# Patient Record
Sex: MALE | Race: WHITE | Employment: OTHER | URBAN - METROPOLITAN AREA
[De-identification: names, ages, dates, MRNs, and addresses within clinical notes are randomized per-mention and may not be internally consistent; named-entity substitution may affect disease eponyms.]

---

## 2017-03-24 ENCOUNTER — ALLSCRIPTS OFFICE VISIT (OUTPATIENT)
Dept: OTHER | Facility: OTHER | Age: 82
End: 2017-03-24

## 2017-03-29 ENCOUNTER — GENERIC CONVERSION - ENCOUNTER (OUTPATIENT)
Dept: OTHER | Facility: OTHER | Age: 82
End: 2017-03-29

## 2017-09-14 ENCOUNTER — ALLSCRIPTS OFFICE VISIT (OUTPATIENT)
Dept: OTHER | Facility: OTHER | Age: 82
End: 2017-09-14

## 2017-10-04 ENCOUNTER — GENERIC CONVERSION - ENCOUNTER (OUTPATIENT)
Dept: OTHER | Facility: OTHER | Age: 82
End: 2017-10-04

## 2018-01-12 NOTE — PROGRESS NOTES
Assessment    1  Initial Medicare annual wellness visit (V70 0) (Z00 00)   2  Need for pneumococcal vaccination (V03 82) (Z23)    Plan  Health Maintenance    · Zoster (Zostavax) (Zoster (Zostavax))      Health Maintenance (V70 0) (Z00 00)          Discussion/Summary    Prevnar 13 today  Impression: Initial Annual Wellness Visit, with preventive exam as well as age and risk appropriate counseling completed  Cardiovascular screening and counseling: the risks and benefits of screening were discussed and screening is current  Diabetes screening and counseling: the risks and benefits of screening were discussed and screening is current  Colorectal cancer screening and counseling: the risks and benefits of screening were discussed, screening is current and he does not wish to get screened anymore  Osteoporosis screening and counseling: screening not indicated  Abdominal aortic aneurysm screening and counseling: screening is current  Glaucoma screening and counseling: the risks and benefits of screening were discussed and screening is current  HIV screening and counseling: the patient declines screening and screening not indicated  (had labs today which are pending) Immunizations: the risks and benefits of influenza vaccination were discussed with the patient, influenza vaccine is up to date this year, the risks and benefits of pneumococcal vaccination were discussed with the patient, pneumococcal vaccine due today, the risks and benefits of the Zostavax vaccine were discussed with the patient and Zostavax vaccine needed today  Advance Directive Planning: complete and up to date, paperwork and instructions were given to the patient  Patient Discussion: plan discussed with the patient  Chief Complaint  Patient is here for a Medicare Well Exam       History of Present Illness  The patient is being seen for the initial annual wellness visit     Medicare Screening and Risk Factors Hospitalizations: no previous hospitalizations  Medicare Screening Tests Risk Questions   Abdominal aortic aneurysm risk assessment: over 72years of age and yes has assessment  next appt 4/16 dr Drew Fry  Osteoporosis risk assessment:  and over 48years of age  HIV risk assessment: none indicated  Drug and Alcohol Use: The patient is a former cigarette smoker and quit smoking 20yrs ago  The patient reports never drinking alcohol  He has never used illicit drugs  Diet and Physical Activity: Current diet includes well balanced meals, limited junk food, 2 servings of fruit per day, 2 servings of vegetables per day, 1 servings of meat per day, 1 servings of whole grains per day, 1 servings of simple carbohydrates per day, 2 servings of dairy products per day, 3 cups of coffee per day and 1 cans of diet soda per day  He exercises 3 times per week  Exercise: walking, stretching 50-80 minutes per week  Mood Disorder and Cognitive Impairment Screening: He denies feeling down, depressed, or hopeless over the past two weeks  He denies feeling little interest or pleasure in doing things over the past two weeks  Functional Ability/Level of Safety: Hearing is slightly decreased, slightly decreased in the right ear, slightly decreased in the left ear and a hearing aid is not used  He reports hearing difficulties  The patient is currently able to do activities of daily living without limitations, able to participate in social activities without limitations and able to drive without limitations  Activities of daily living details: needs help managing medications, but does not need help using the phone, no transportation help needed, does not need help shopping, no meal preparation help needed, does not need help doing housework, does not need help doing laundry and does not need help managing money  Fall risk factors:  polypharmacy and antihypertensive use, but The patient fell 0 times in the past 12 months  Home safety risk factors:  no unfamiliar surroundings, no loose rugs, no poor household lighting, no uneven floors, no household clutter, grab bars in the bathroom and handrails on the stairs  Advance Directives: Advance directives: living will, durable power of  for health care directives and advance directives  Co-Managers and Medical Equipment/Suppliers: See Patient Care Team      Patient Care Team    Care Team Member Role Specialty Office Number   [id number]  Nephrology [phone]   Zeus Reid MD  Peripheral Vascular Disease [phone]     Review of Systems    Constitutional: negative  Eyes: negative  ENT: negative  Cardiovascular: negative  Respiratory: negative  Gastrointestinal: negative  Genitourinary: negative  Musculoskeletal: diffuse joint pain  Integumentary and Breasts: negative  Neurological: negative  Psychiatric: negative  Endocrine: negative  Hematologic and Lymphatic: negative  Over the past 2 weeks, how often have you been bothered by the following problems? 1 ) Little interest or pleasure in doing things? Not at all    2 ) Feeling down, depressed or hopeless? Not at all    3 ) Trouble falling asleep or sleeping too much? Not at all    4 ) Feeling tired or having little energy? Not at all    5 ) Poor appetite or overeating? Not at all    6 ) Feeling bad about yourself, or that you are a failure, or have let yourself or your family down? Not at all    7 ) Trouble concentrating on things, such as reading a newspaper or watching television? Not at all    8 ) Moving or speaking so slowly that other people could have noticed, or the opposite, moving or speaking faster than usual? Not at all  How difficult have these problems made it for you to do your work, take care of things at home, or get along with people? Not at all  Score 0         Preventive Quality 65 and Older: Falls Risk: The patient fell 0 times in the past 12 months     The patient currently has no urinary incontinence symptoms  Active Problems    1  CKD (chronic kidney disease) (585 9) (N18 9)   2  Colon cancer screening (V76 51) (Z12 11)   3  Gout (274 9) (M10 9)   4  Hypercholesterolemia (272 0) (E78 0)   5  Hypertension (401 9) (I10)   6  Hypertensive kidney disease, benign (403 10,585 9) (I12 9,N18 9)   7  Initial Medicare annual wellness visit (V70 0) (Z00 00)   8  Need for pneumococcal vaccination (V03 82) (Z23)      Hypertension (401 9) (I10)       Hypercholesterolemia (272 0) (E78 0)       CKD (chronic kidney disease) (585 9) (N18 9)          Past Medical History    1  History of Chronic kidney disease, stage 3 (585 3) (N18 3)   2  CKD (chronic kidney disease) (585 9) (N18 9)   3  History of Gout (274 9) (M10 9)   4  History of abdominal aortic aneurysm (V12 59) (Z86 79)    The active problems and past medical history were reviewed and updated today  CKD (chronic kidney disease) (585 9) (N18 9)             Surgical History    1  History of Umbilical Hernia Herniorrhaphy    The surgical history was reviewed and updated today  Family History    1  No pertinent family history    The family history was reviewed and updated today  Social History    · Caffeine Use   · Former smoker (P24 58) (G56 123)   · Never Drank Alcohol  The social history was reviewed and updated today  Current Meds   1  Adult Aspirin EC Low Strength 81 MG Oral Tablet Delayed Release Recorded   2  AmLODIPine Besylate 5 MG Oral Tablet; Therapy: 47TOK6178 to (Last Rx:11Oct2010)  Requested for: 16KVZ6728 Ordered   3  Atorvastatin Calcium 10 MG Oral Tablet; take 1 tablet by mouth once daily; Therapy: 03HUP2122 to (Neda Padilla)  Requested for: 73GBD6926; Last   Rx:09Mar2016 Ordered   4  Metoprolol Succinate  MG Oral Tablet Extended Release 24 Hour; TAKE 1 TABLET   DAILY; Therapy: 95QWE2014 to (Evaluate:04Mar2017); Last Rx:09Mar2016 Ordered   5   Multiple Vitamins Oral Tablet Recorded   6  Potassium Chloride ER 10 MEQ Oral Tablet Extended Release; TAKE ONE (1)   TABLET(S) DAILY; Therapy: 90ELU5317 to (Evaluate:04Mar2017); Last Rx:09Mar2016 Ordered   7  Uloric 40 MG Oral Tablet; TAKE 1 TABLET BY MOUTH ONCE DAILY; Therapy: 19VOJ6608 to (Evaluate:07Jul2016)  Requested for: 61NIZ2871; Last   Rx:09Mar2016 Ordered   8  Valsartan-Hydrochlorothiazide 320-12 5 MG Oral Tablet; Take 1 tablet daily; Therapy: 84SAF8361 to (Evaluate:04Mar2017); Last Rx:09Mar2016 Ordered   9  Vitamin D 1000 UNIT Oral Tablet Recorded   10  Zoster (Zostavax); as directed; Therapy: 54PHG4274 to (Last Rx:50Yxg0610) Ordered    The medication list was reviewed and updated today  Allergies    1  Aciphex TBEC   2  Augmentin TABS   3  Prevacid 24HR CPDR    Immunizations  Influenza --- Amarilys Pippins: 27UMA2234Pughe Sago: 35FDX2954Pfoiwcrt Rodriguez: 19YLI7276; La Kappa: 62HWA3355   Pneumococcal --- Amarilys Pippins: 11HFU3212     Vitals  Signs [Data Includes: Current Encounter]   Recorded: 49SMY9968 11:04AM   Temperature: 98 2 F  Heart Rate: 78  Systolic: 236  Diastolic: 82  Height: 6 ft   Weight: 204 lb 3 2 oz  BMI Calculated: 27 69  BSA Calculated: 2 15  O2 Saturation: 94    Results/Data  Encounter Results   PHQ-9 Adult Depression Screening 31JPT2996 11:13AM User, Ahs     Test Name Result Flag Reference   PHQ-9 Adult Depression Score 0     Q1: 0, Q2: 0, Q3: 0, Q4: 0, Q5: 0, Q6: 0, Q7: 0, Q8: 0, Q9: 0   PHQ-9 Adult Depression Screening Negative     PHQ-9 Difficulty Level Not difficult at all     PHQ-9 Severity No Depression         Future Appointments    Date/Time Provider Specialty Site   09/09/2016 10:20 AM Anjel Lua MD Family Medicine [address] [place] [address]     Physical Exam    Constitutional   General appearance: No acute distress, well appearing and well nourished  Eyes   Conjunctiva and lids: No erythema, swelling or discharge  Pupils and irises: Equal, round, reactive to light      Ears, Nose, Mouth, and Throat   External inspection of ears and nose: Normal     Otoscopic examination: Tympanic membranes translucent with normal light reflex  Canals patent without erythema  Hearing: Normal     Nasal mucosa, septum, and turbinates: Normal without edema or erythema  Lips, teeth, and gums: Normal, good dentition  Oropharynx: Normal with no erythema, edema, exudate or lesions  Neck   Neck: Supple, symmetric, trachea midline, no masses  Pulmonary   Respiratory effort: No increased work of breathing or signs of respiratory distress  Auscultation of lungs: Clear to auscultation  Cardiovascular   Auscultation of heart: Normal rate and rhythm, normal S1 and S2, no murmurs  Carotid pulses: 2+ bilaterally  Examination of extremities for edema and/or varicosities: Normal     Abdomen   Abdomen: Non-tender, no masses  Lymphatic   Palpation of lymph nodes in neck: No lymphadenopathy  Musculoskeletal   Gait and station: Normal     Inspection/palpation of digits and nails: Normal without clubbing or cyanosis  Inspection/palpation of joints, bones, and muscles: Normal     Range of motion: Normal     Stability: Normal     Muscle strength/tone: Normal     Psychiatric   Judgment and insight: Normal     Orientation to person, place and time: Normal     Recent and remote memory: Intact      Mood and affect: Normal        Signatures   Electronically signed by : Leyla Guzman MD; Mar  9 2016 11:49AM EST                       (Author)

## 2018-01-13 VITALS
TEMPERATURE: 97.8 F | BODY MASS INDEX: 28.32 KG/M2 | WEIGHT: 208.81 LBS | RESPIRATION RATE: 16 BRPM | OXYGEN SATURATION: 96 % | SYSTOLIC BLOOD PRESSURE: 140 MMHG | DIASTOLIC BLOOD PRESSURE: 82 MMHG | HEART RATE: 72 BPM

## 2018-01-14 VITALS
DIASTOLIC BLOOD PRESSURE: 82 MMHG | BODY MASS INDEX: 28.42 KG/M2 | HEART RATE: 62 BPM | OXYGEN SATURATION: 96 % | WEIGHT: 209.8 LBS | HEIGHT: 72 IN | TEMPERATURE: 97.1 F | SYSTOLIC BLOOD PRESSURE: 138 MMHG

## 2018-01-17 NOTE — PROGRESS NOTES
Assessment    1  Medicare annual wellness visit, subsequent (V70 0) (Z00 00)    Plan   Gout    · Metoprolol Succinate  MG Oral Tablet Extended Release 24 Hour; Take 1  tablet by mouth  daily   · Valsartan-Hydrochlorothiazide 320-12 5 MG Oral Tablet; Take 1 tablet by mouth   daily  Hypercholesterolemia    · Atorvastatin Calcium 10 MG Oral Tablet (Lipitor); take 1 tablet by mouth once  daily  Hypertension    · Potassium Chloride ER 10 MEQ Oral Tablet Extended Release; Take 1 tablet by  mouth  daily  Medicare annual wellness visit, subsequent    · Eat a low fat and low cholesterol diet ; Status:Complete;   Done: 23HHU3302   · Keep a diary of when and what you eat ; Status:Complete;   Done: 52PUL8437   · There are many exercise options for seniors ; Status:Complete;   Done: 12QAW2937   · These are things you can do to prevent falls in and around the home ; Status:Complete;    Done: 28WAI5548    Hypertension (401 9) (I10)       Hypercholesterolemia (272 0) (E78 00)          Discussion/Summary    AWV completed  Advised him to bring a copy of advanced directives  Declined colon cancer screening  He will get Shingles vaccine at pharmacy  Impression: Subsequent Annual Wellness Visit, with preventive exam as well as age and risk appropriate counseling completed  Cardiovascular screening and counseling: the risks and benefits of screening were discussed and screening is current  Diabetes screening and counseling: the risks and benefits of screening were discussed and screening is current  Colorectal cancer screening and counseling: the risks and benefits of screening were discussed, the patient declines screening and screening not indicated  Prostate cancer screening and counseling: the risks and benefits of screening were discussed and screening not indicated  Osteoporosis screening and counseling: screening not indicated     Abdominal aortic aneurysm screening and counseling: the risks and benefits of screening were discussed and screening is current  Glaucoma screening and counseling: the risks and benefits of screening were discussed and screening is current  HIV screening and counseling: the patient declines screening  (He had labs done about 1 week ago - Dr Hunter Martin ordered  ) Immunizations: the risks and benefits of influenza vaccination were discussed with the patient, influenza vaccine is up to date this year, the risks and benefits of pneumococcal vaccination were discussed with the patient, the lifetime pneumococcal vaccine has been completed, the risks and benefits of the Zostavax vaccine were discussed with the patient, Zostavax vaccine needed today, the risks and benefits of the Tdap vaccine were discussed with the patient and the patient declines the Tdap vaccine  Advance Directive Planning: complete and up to date, paperwork and instructions were given to the patient, he was encouraged to follow-up with me to discuss his questions and/or decisions  Patient Discussion: plan discussed with the patient, follow-up visit needed in 6 months   Chief Complaint  AWV      Advance Directives  Advance Directive [facility]:   YES - Patient has an advance health care directive  History of Present Illness  The patient is being seen for the subsequent annual wellness visit  Medicare Screening and Risk Factors   Hospitalizations: no previous hospitalizations  Once per lifetime medicare screening tests: AAA screening [place] (11/2016)  Medicare Screening Tests Risk Questions   Abdominal aortic aneurysm risk assessment: tobacco use, over 72years of age and He has an aneurysm  He is having this checked in April  Follows up with Vascular  Osteoporosis risk assessment: none indicated  HIV risk assessment: none indicated  Drug and Alcohol Use: The patient is a former cigarette smoker and quit smoking 15 YEARS AGO  The patient reports never drinking alcohol  He has never used illicit drugs     Diet and Physical Activity: Current diet includes well balanced meals, 2 servings of fruit per day, 3 servings of vegetables per day, 1 servings of whole grains per day, 2 servings of dairy products per day and 2 cups of coffee per day  Exercise: walking, strength training 15 minutes per day  Mood Disorder and Cognitive Impairment Screening: PHQ-9 Depression Scale He denies feeling down, depressed, or hopeless over the past two weeks  He denies feeling little interest or pleasure in doing things over the past two weeks  Functional Ability/Level of Safety: Hearing is normal bilaterally and a hearing aid is not used  The patient is currently able to do activities of daily living without limitations, able to do instrumental activities of daily living without limitations, able to participate in social activities without limitations and able to drive without limitations  Activities of daily living details: does not need help using the phone, no transportation help needed, does not need help shopping, no meal preparation help needed, does not need help doing housework, does not need help doing laundry, does not need help managing medications and does not need help managing money  Fall risk factors: The patient fell 0 times in the past 12 months  Advance Directives: Advance directives: living will, durable power of  for health care directives and advance directives  end of life decisions were reviewed with the patient  Co-Managers and Medical Equipment/Suppliers: See Patient Care Team      Patient Care Team    Care Team Member Role Specialty Office Number   [id number]  Nephrology [phone]   Ravi Lai MD  Peripheral Vascular Disease [phone]     Review of Systems    Constitutional: negative  Eyes: negative  ENT: negative  Cardiovascular: negative  Respiratory: negative  Gastrointestinal: negative  Genitourinary: negative  Musculoskeletal: negative     Integumentary and Breasts: negative  Neurological: negative  Psychiatric: negative  Endocrine: negative  Hematologic and Lymphatic: negative  Over the past 2 weeks, how often have you been bothered by the following problems? 1 ) Little interest or pleasure in doing things? Not at all    2 ) Feeling down, depressed or hopeless? Not at all    3 ) Trouble falling asleep or sleeping too much? Not at all    4 ) Feeling tired or having little energy? Not at all    5 ) Poor appetite or overeating? Not at all    6 ) Feeling bad about yourself, or that you are a failure, or have let yourself or your family down? Not at all    7 ) Trouble concentrating on things, such as reading a newspaper or watching television? Not at all    8 ) Moving or speaking so slowly that other people could have noticed, or the opposite, moving or speaking faster than usual? Not at all    9 ) Thoughts that you would be better off dead or of hurting yourself in some way? Not at all  Score 0      Active Problems     1  Colon cancer screening (V76 51) (Z12 11)   2  Gout (274 9) (M10 9)   3  Hypertensive kidney disease, benign (403 10) (I12 9)   4  Need for pneumococcal vaccination (V03 82) (Z23)    Hypertension (401 9) (I10)       Hypercholesterolemia (272 0) (E78 00)       CKD (chronic kidney disease) (585 9) (N18 9)          Past Medical History     1  History of Chronic kidney disease, stage 3 (585 3) (N18 3)   2  History of Gout (274 9) (M10 9)   3  History of abdominal aortic aneurysm (V12 59) (Z86 79)   4  Need for prophylactic vaccination and inoculation against influenza (V04 81) (Z23)    The active problems and past medical history were reviewed and updated today  CKD (chronic kidney disease) (585 9) (N18 9)             Surgical History    1  History of Umbilical Hernia Herniorrhaphy    The surgical history was reviewed and updated today  Family History  Sibling    1   No pertinent family history    The family history was reviewed and updated today  Social History    · Caffeine Use   · Former smoker (W67 01) (A31 120)   · Never Drank Alcohol  The social history was reviewed and updated today  Current Meds   1  Adult Aspirin EC Low Strength 81 MG Oral Tablet Delayed Release Recorded   2  AmLODIPine Besylate 5 MG Oral Tablet; Therapy: 27QPO3260 to (Last Rx:11Oct2010)  Requested for: 99GEZ2668 Ordered   3  Atorvastatin Calcium 10 MG Oral Tablet; take 1 tablet by mouth once daily; Therapy: 50IDZ4244 to (Evaluate:23Apr2017)  Requested for: 60CRC0879; Last   Rx:23Jan2017 Ordered   4  Metoprolol Succinate  MG Oral Tablet Extended Release 24 Hour; Take 1 tablet by   mouth  daily; Therapy: 46NBE4129 to (Evaluate:23Apr2017)  Requested for: 93YSN0339; Last   Rx:23Jan2017 Ordered   5  Multiple Vitamins Oral Tablet Recorded   6  Potassium Chloride ER 10 MEQ Oral Tablet Extended Release; Take 1 tablet by mouth    daily; Therapy: 98LAN5582 to (Evaluate:23Apr2017)  Requested for: 07HKM0826; Last   Rx:23Jan2017 Ordered   7  Uloric 40 MG Oral Tablet; take 1 tablet by mouth once daily; Therapy: 81CAW6204 to (Evaluate:11Jan2017)  Requested for: 21Kvv7163; Last   Rx:81Ewg7175 Ordered   8  Valsartan-Hydrochlorothiazide 320-12 5 MG Oral Tablet; Take 1 tablet by mouth  daily; Therapy: 70MML7294 to (Evaluate:23Apr2017)  Requested for: 39EHW9821; Last   Rx:23Jan2017 Ordered   9  Vitamin D 1000 UNIT Oral Tablet Recorded    The medication list was reviewed and updated today  Allergies    1  Aciphex TBEC   2  Augmentin TABS   3   Prevacid 24HR CPDR    Immunizations  Influenza --- Chavarria Ege: 28-Tjc-7001Wvfej Maroon: 96-Gzc-1982Wkohkb Roes: 18-Aug-2014; Mela Hunter:  09-Sep-2015; Series5: 09-Sep-2016   PCV --- Series1: 09-Mar-2016   PPSV --- Series1: 18-Nov-2004     Results/Data  PHQ-9 Adult Depression Screening 78BYK3555 10:54AM User, Ahs     Test Name Result Flag Reference   PHQ-9 Adult Depression Score 0     Over the last two weeks, how often have you been bothered by any of the following problems? Little interest or pleasure in doing things: Not at all - 0  Feeling down, depressed, or hopeless: Not at all - 0  Trouble falling or staying asleep, or sleeping too much: Not at all - 0  Feeling tired or having little energy: Not at all - 0  Poor appetite or over eating: Not at all - 0  Feeling bad about yourself - or that you are a failure or have let yourself or your family down: Not at all - 0  Trouble concentrating on things, such as reading the newspaper or watching television: Not at all - 0  Moving or speaking so slowly that other people could have noticed  Or the opposite -  being so fidgety or restless that you have been moving around a lot more than usual: Not at all - 0  Thoughts that you would be better off dead, or of hurting yourself in some way: Not at all - 0   PHQ-9 Adult Depression Screening Negative     PHQ-9 Difficulty Level Not difficult at all     PHQ-9 Severity No Depression         Health Management  Health Maintenance   Medicare Annual Wellness Visit; every 1 year; Next Due: [id number];  Overdue    Signatures   Electronically signed by : Judge Davy MD; Mar 24 2017 12:00PM EST                       (Author)

## 2018-03-27 ENCOUNTER — OFFICE VISIT (OUTPATIENT)
Dept: FAMILY MEDICINE CLINIC | Facility: CLINIC | Age: 83
End: 2018-03-27
Payer: MEDICARE

## 2018-03-27 ENCOUNTER — TRANSCRIBE ORDERS (OUTPATIENT)
Dept: ADMINISTRATIVE | Facility: HOSPITAL | Age: 83
End: 2018-03-27

## 2018-03-27 ENCOUNTER — APPOINTMENT (OUTPATIENT)
Dept: LAB | Facility: CLINIC | Age: 83
End: 2018-03-27
Payer: MEDICARE

## 2018-03-27 VITALS
HEIGHT: 67 IN | TEMPERATURE: 97.1 F | SYSTOLIC BLOOD PRESSURE: 130 MMHG | WEIGHT: 209.2 LBS | HEART RATE: 78 BPM | BODY MASS INDEX: 32.83 KG/M2 | DIASTOLIC BLOOD PRESSURE: 66 MMHG | OXYGEN SATURATION: 99 %

## 2018-03-27 DIAGNOSIS — I10 ESSENTIAL HYPERTENSION: ICD-10-CM

## 2018-03-27 DIAGNOSIS — E78.5 HYPERLIPIDEMIA, UNSPECIFIED HYPERLIPIDEMIA TYPE: Primary | ICD-10-CM

## 2018-03-27 DIAGNOSIS — E78.00 HYPERCHOLESTEROLEMIA: Primary | ICD-10-CM

## 2018-03-27 DIAGNOSIS — E78.5 HYPERLIPIDEMIA, UNSPECIFIED HYPERLIPIDEMIA TYPE: ICD-10-CM

## 2018-03-27 DIAGNOSIS — N18.9 CHRONIC KIDNEY DISEASE, UNSPECIFIED CKD STAGE: ICD-10-CM

## 2018-03-27 DIAGNOSIS — Z00.00 MEDICARE ANNUAL WELLNESS VISIT, SUBSEQUENT: Primary | ICD-10-CM

## 2018-03-27 DIAGNOSIS — M10.9 GOUT, UNSPECIFIED CAUSE, UNSPECIFIED CHRONICITY, UNSPECIFIED SITE: ICD-10-CM

## 2018-03-27 LAB
CHOLEST SERPL-MCNC: 162 MG/DL (ref 50–200)
HDLC SERPL-MCNC: 74 MG/DL (ref 40–60)
LDLC SERPL CALC-MCNC: 66 MG/DL (ref 0–100)
TRIGL SERPL-MCNC: 108 MG/DL

## 2018-03-27 PROCEDURE — G0439 PPPS, SUBSEQ VISIT: HCPCS | Performed by: FAMILY MEDICINE

## 2018-03-27 PROCEDURE — 36415 COLL VENOUS BLD VENIPUNCTURE: CPT

## 2018-03-27 PROCEDURE — 99214 OFFICE O/P EST MOD 30 MIN: CPT | Performed by: FAMILY MEDICINE

## 2018-03-27 PROCEDURE — 80061 LIPID PANEL: CPT

## 2018-03-27 RX ORDER — ASPIRIN 81 MG/1
TABLET ORAL
COMMUNITY

## 2018-03-27 RX ORDER — POTASSIUM CHLORIDE 750 MG/1
TABLET, FILM COATED, EXTENDED RELEASE ORAL
COMMUNITY
Start: 2018-03-12 | End: 2018-03-27 | Stop reason: SDUPTHER

## 2018-03-27 RX ORDER — ATORVASTATIN CALCIUM 10 MG/1
10 TABLET, FILM COATED ORAL DAILY
Qty: 90 TABLET | Refills: 3 | Status: SHIPPED | OUTPATIENT
Start: 2018-03-27 | End: 2019-03-28 | Stop reason: SDUPTHER

## 2018-03-27 RX ORDER — POTASSIUM CHLORIDE 750 MG/1
10 TABLET, FILM COATED, EXTENDED RELEASE ORAL DAILY
Qty: 90 TABLET | Refills: 3 | Status: SHIPPED | OUTPATIENT
Start: 2018-03-27 | End: 2019-03-28 | Stop reason: SDUPTHER

## 2018-03-27 RX ORDER — AMLODIPINE BESYLATE 5 MG/1
TABLET ORAL
COMMUNITY
Start: 2018-03-12 | End: 2018-03-27 | Stop reason: SDUPTHER

## 2018-03-27 RX ORDER — FEBUXOSTAT 40 MG/1
TABLET ORAL
COMMUNITY
Start: 2018-03-12 | End: 2018-03-27 | Stop reason: SDUPTHER

## 2018-03-27 RX ORDER — ATORVASTATIN CALCIUM 10 MG/1
TABLET, FILM COATED ORAL
COMMUNITY
Start: 2018-03-12 | End: 2018-03-27 | Stop reason: SDUPTHER

## 2018-03-27 RX ORDER — VALSARTAN AND HYDROCHLOROTHIAZIDE 320; 12.5 MG/1; MG/1
1 TABLET, FILM COATED ORAL DAILY
Qty: 90 TABLET | Refills: 3 | Status: SHIPPED | OUTPATIENT
Start: 2018-03-27 | End: 2018-09-27 | Stop reason: ALTCHOICE

## 2018-03-27 RX ORDER — VALSARTAN AND HYDROCHLOROTHIAZIDE 320; 12.5 MG/1; MG/1
TABLET, FILM COATED ORAL
COMMUNITY
Start: 2018-03-12 | End: 2018-03-27 | Stop reason: SDUPTHER

## 2018-03-27 RX ORDER — AMLODIPINE BESYLATE 5 MG/1
5 TABLET ORAL DAILY
Qty: 90 TABLET | Refills: 3 | Status: SHIPPED | OUTPATIENT
Start: 2018-03-27 | End: 2019-03-28 | Stop reason: SDUPTHER

## 2018-03-27 RX ORDER — FEBUXOSTAT 40 MG/1
40 TABLET ORAL DAILY
Qty: 90 TABLET | Refills: 3 | Status: SHIPPED | OUTPATIENT
Start: 2018-03-27 | End: 2019-03-28 | Stop reason: SDUPTHER

## 2018-03-27 RX ORDER — METOPROLOL SUCCINATE 100 MG/1
TABLET, EXTENDED RELEASE ORAL
COMMUNITY
Start: 2018-03-12 | End: 2018-03-27 | Stop reason: SDUPTHER

## 2018-03-27 RX ORDER — METOPROLOL SUCCINATE 100 MG/1
100 TABLET, EXTENDED RELEASE ORAL DAILY
Qty: 90 TABLET | Refills: 3 | Status: SHIPPED | OUTPATIENT
Start: 2018-03-27 | End: 2019-03-28 | Stop reason: SDUPTHER

## 2018-03-27 NOTE — PROGRESS NOTES
HPI:  Wilfredo Fisher is a 80 y o  male here for his Subsequent Wellness Visit  Patient Active Problem List   Diagnosis    CKD (chronic kidney disease)    Coronary atherosclerosis    Gout    Hypercholesterolemia    Hypertension     Past Medical History:   Diagnosis Date    Chronic kidney disease     stage 3  last assessed 14 sept 2017    Dissecting aortic aneurysm (any part), abdominal ([address] )     Gout      Past Surgical History:   Procedure Laterality Date    CHOLECYSTECTOMY      UMBILICAL HERNIA REPAIR       Family History   Problem Relation Age of Onset    No Known Problems Family      sibling (not specified)     History   Smoking Status    Former Smoker   Smokeless Tobacco    Never Used     History   Alcohol Use No      History   Drug use: Unknown     There were no vitals taken for this visit  Current Outpatient Prescriptions   Medication Sig Dispense Refill    amLODIPine (NORVASC) 5 mg tablet Take 1 tablet (5 mg total) by mouth daily 90 tablet 3    aspirin (ADULT ASPIRIN EC LOW STRENGTH) 81 mg EC tablet Take by mouth      atorvastatin (LIPITOR) 10 mg tablet Take 1 tablet (10 mg total) by mouth daily 90 tablet 3    cholecalciferol (VITAMIN D3) 1,000 units tablet Take 1 tablet by mouth once a week      metoprolol succinate (TOPROL-XL) 100 mg 24 hr tablet Take 1 tablet (100 mg total) by mouth daily 90 tablet 3    potassium chloride (K-DUR) 10 mEq tablet Take 1 tablet (10 mEq total) by mouth daily 90 tablet 3    ULORIC 40 MG tablet Take 1 tablet (40 mg total) by mouth daily 90 tablet 3    valsartan-hydrochlorothiazide (DIOVAN-HCT) 320-12 5 MG per tablet Take 1 tablet by mouth daily 90 tablet 3     No current facility-administered medications for this visit        Allergies   Allergen Reactions    Augmentin Es-600  [Amoxicillin-Pot Clavulanate]     Lansoprazole     Rabeprazole      Immunization History   Administered Date(s) Administered    Influenza Split High Dose Preservative Free IM 09/20/2013, 08/18/2014, 09/09/2016, 09/14/2017    Influenza TIV (IM) 09/20/2013, 09/09/2015    Pneumococcal Conjugate 13-Valent 03/09/2016    Pneumococcal Polysaccharide PPV23 11/18/2004       Patient Care Team:  Kylie Fitzpatrick MD as PCP - Raisa Tong MD      Medicare Screening Tests and Risk Assessments:  AWV Clinical     ISAR:   Previous hospitalizations?:  No       Once in a Lifetime Medicare Screening:   EKG performed?:  No    AAA screening performed? (if performed, please add date to Health Maintenance):  No       Medicare Screening Tests and Risk Assessment:   AAA Risk Assessment     Tobacco use (males only):   Yes   Age over 72 (males only):  Yes    Osteoporosis Risk Assessment    :  Yes    Age over 48:  Yes    Tobacco use:  Yes    HIV Risk Assessment    None indicated:  Yes        Drug and Alcohol Use:   Tobacco use    Cigarettes:  former smoker    Tobacco use duration    Tobacco Cessation Readiness    Alcohol use    Alcohol use:  never    Alcohol Treatment Readiness   Illicit Drug Use    Drug use:  never        Diet & Exercise:   Diet   What is your diet?:  Regular, No Added Salt, Limited junk food   How many servings a day of the following:   Fruits and Vegetables:  1-2 Meat:  1-2   Whole Grains:  1 Simple Carbs:  1   Dairy:  1 Soda:  0   Coffee:  1 Tea:  1   Exercise    Do you currently exercise?:  yes    Frequency:  daily    Type of exercise:  swimming       Cognitive Impairment Screening:   Cognitive Impairment Screening    Do you have difficulty learning or retaining new information?:  No        Functional Ability/Level of Safety:   Hearing    Hearing difficulties:  No Bilateral:  normal   Left:  normal Right:  normal   Hearing Impairment Assessment    Hearing status:  No impairment   Current Activities    Status:  unlimited ADL's, unlimited driving   Help needed with the folllowing:    Using the phone:  No Transportation:  No   Shopping:  No Preparing Meals:  No   Doing Housework:  No Doing Laundry:  No   Managing Medications:  No Managing Money:  No   ADL    Fall Risk   Have you fallen in the last 12 months?:  No    Injury History       Home Safety:   Are there hazards in your environment?:  No   If you fell, would you need help to get back up from the ground?:  No    Do you feel unsteady when walking?:  No    Do you have handrails and grab-bars in the home?:  No    Are you and/or family members aware of the dangers of smoking in bed?:  No Are firearms stored securely?:  Yes   Do you have working smoke alarms and fire extinguisher?:  No    Have you left the stove on unsupervised?:  No    Home Safety Risk Factors   Unfamilar with surroundings:  No Uneven floors:  No   Stairs or handrail saftey risk:  No Loose rugs:  No   Household clutter:  No Poor household lighting:  No   No grab bars in bathroom:  No Further evaluation needed:  No       Advanced Directives:   Advanced Directives    Living Will:  Yes Durable POA for healthcare:   Yes   Advanced directive:  Yes    Patient's End of Life Decisions        Urinary Incontinence:   Do you have urinary incontinence?:  No        Glaucoma:            Provider Screening     Preventative Screening/Counseling:   Cardiovascular Screening/Counseling:   (Labs Q5 years, EKG optional one-time)   General:  Risks and Benefits Discussed, Screening Current Counseling:  Healthy Diet          Diabetes Screening/Counseling:   (2 tests/year if Pre-Diabetes or 1 test/year if no Diabetes)   General:  Screening Current, Risks and Benefits Discussed Counseling:  Healthy Diet, Healthy Weight, Improve Physical Activity          Colorectal Cancer Screening/Counseling:   (FOBT Q1 yr; Flex Sig Q4 yrs or Q10 yrs after Screening Colonoscopy; Screening Colonoscpy Q2 yrs High Risk or Q10 yrs Low Risk; Barium Enema Q2 yrs High Risk or Q4 yrs Low Risk)   General:  Screening Not Indicated, Risks and Benefits Discussed           Prostate Cancer Screening/Counseling: (Annual)    General:  Risks and Benefits Discussed, Screening Not Indicated          Breast Cancer Screening/Counseling:   (Baseline Age 28 - 43; Annual Age 36+)         Cervical Cancer Screening/Counseling:   (Annual for High Risk or Childbearing Age with Abnormal Pap in Last 3 yrs; Every 2 all others)         Osteoporosis Screening/Counseling:   (Every 2 Yrs if at risk or more if medically necessary)   General:  Screening Not Indicated, Risks and Benefits Discussed           AAA Screening/Counseling:   (Once per Lifetime with risk factors)     Age over 72 (males only):  Yes Tobacco use (males only):  Yes   General:  Screening Current           Glaucoma Screening/Counseling:   (Annual)   General:  Risks and Benefits Discussed Due for: Referrals:  referral to ophthalmology         HIV Screening/Counseling:   (Voluntary;  Once annually for high risk OR 3 times for Pregnancy at diagnosis of IUP; 3rd trimester; and at Labor   General:  Screening Not Indicated           Hepatitis C Screening:             Immunizations:   Influenza (annual):  Risks & Benefits Discussed, Influenza UTD This Year   Pneumococcal (Once in a Lifetime):  Risks & Benefits Discussed, Lifetime Vaccine Completed   Zostavax (Medicare D Coverage, Pt >70 yo):  Risks & Benefits Discussed, Patient Declines       Other Preventative Couseling (Non-Medicare Wellness Visit Required):   nutrition counseling performed, fall prevention education provided, car/seat belt/driving safety reviewed, alcohol use counseling provided, Increased physical activity counseling given       Referrals (Non-Medicare Wellness Visit Required):       Medical Equipment/Suppliers:           No exam data present  Reviewed Updated [facility] Prior Wellness Visits:   Last Medicare wellness visit information was reviewed, patient interviewed , no change since last AWVyes  Last Medicare wellness visit information was reviewed, patient interviewed and updates made to the record today yes    Assessment and Plan:  1  Medicare annual wellness visit, subsequent         Health Maintenance Due   Topic Date Due    SLP PLAN OF CARE  1933    Depression Screening PHQ-9  02/12/1945    DTaP,Tdap,and Td Vaccines (1 - Tdap) 02/12/1954    GLAUCOMA SCREENING 67+ YR  08/11/2015       Labs done today  Advised eye exam - will schedule

## 2018-03-27 NOTE — PROGRESS NOTES
Assessment/Plan:     Diagnoses and all orders for this visit:    Hypercholesterolemia  -     atorvastatin (LIPITOR) 10 mg tablet; Take 1 tablet (10 mg total) by mouth daily    Essential hypertension  -     amLODIPine (NORVASC) 5 mg tablet; Take 1 tablet (5 mg total) by mouth daily  -     metoprolol succinate (TOPROL-XL) 100 mg 24 hr tablet; Take 1 tablet (100 mg total) by mouth daily  -     potassium chloride (K-DUR) 10 mEq tablet; Take 1 tablet (10 mEq total) by mouth daily  -     valsartan-hydrochlorothiazide (DIOVAN-HCT) 320-12 5 MG per tablet; Take 1 tablet by mouth daily    Chronic kidney disease, unspecified CKD stage    Gout, unspecified cause, unspecified chronicity, unspecified site  -     ULORIC 40 MG tablet; Take 1 tablet (40 mg total) by mouth daily    Other orders  -     aspirin (ADULT ASPIRIN EC LOW STRENGTH) 81 mg EC tablet; Take by mouth  -     Discontinue: amLODIPine (NORVASC) 5 mg tablet;   -     Discontinue: atorvastatin (LIPITOR) 10 mg tablet;   -     Discontinue: ULORIC 40 MG tablet;   -     Discontinue: metoprolol succinate (TOPROL-XL) 100 mg 24 hr tablet;   -     Discontinue: potassium chloride (K-DUR) 10 mEq tablet;   -     Discontinue: valsartan-hydrochlorothiazide (DIOVAN-HCT) 320-12 5 MG per tablet;   -     cholecalciferol (VITAMIN D3) 1,000 units tablet; Take 1 tablet by mouth once a week        Stable doing well  Continue same medications  Labs done this morning are pending  Following with Dr Crews nephrology  F/U 6 mo  See AWV    Subjective:      Patient ID: Little Carrel is a 80 y o  male  Here to review chronic medical problems  Has CKD - sees Nephrology [facility]  Labs done this AM - pending  HTN - stable on current meds  At home runs in 130/70  No Cp, SOB, edema  Needs meds renewed  Lipids - had labs done this morning which are still pending  He is on a statin      Hyperlipidemia   This is a chronic problem  The current episode started more than 1 year ago  Exacerbating diseases include chronic renal disease and obesity  He has no history of diabetes  Pertinent negatives include no chest pain, leg pain, myalgias or shortness of breath  Current antihyperlipidemic treatment includes statins  Risk factors for coronary artery disease include male sex, hypertension and obesity  Hypertension   This is a chronic problem  The current episode started more than 1 year ago  The problem is unchanged  The problem is controlled  Pertinent negatives include no chest pain, headaches, palpitations or shortness of breath  Identifiable causes of hypertension include chronic renal disease  The following portions of the patient's history were reviewed and updated as appropriate: He  has a past medical history of Chronic kidney disease; Dissecting aortic aneurysm (any part), abdominal ([address] ); and Gout  He   Patient Active Problem List    Diagnosis Date Noted    CKD (chronic kidney disease) 07/28/2014    Hypercholesterolemia 06/18/2013    Gout 11/26/2012    Hypertension 11/26/2012    Coronary atherosclerosis 03/26/2012     He  has a past surgical history that includes Cholecystectomy and Umbilical hernia repair  His family history includes No Known Problems in his family  He  reports that he has quit smoking  He has never used smokeless tobacco  He reports that he does not drink alcohol  His drug history is not on file    Current Outpatient Prescriptions   Medication Sig Dispense Refill    amLODIPine (NORVASC) 5 mg tablet Take 1 tablet (5 mg total) by mouth daily 90 tablet 3    aspirin (ADULT ASPIRIN EC LOW STRENGTH) 81 mg EC tablet Take by mouth      atorvastatin (LIPITOR) 10 mg tablet Take 1 tablet (10 mg total) by mouth daily 90 tablet 3    cholecalciferol (VITAMIN D3) 1,000 units tablet Take 1 tablet by mouth once a week      metoprolol succinate (TOPROL-XL) 100 mg 24 hr tablet Take 1 tablet (100 mg total) by mouth daily 90 tablet 3    potassium chloride (K-DUR) 10 mEq tablet Take 1 tablet (10 mEq total) by mouth daily 90 tablet 3    ULORIC 40 MG tablet Take 1 tablet (40 mg total) by mouth daily 90 tablet 3    valsartan-hydrochlorothiazide (DIOVAN-HCT) 320-12 5 MG per tablet Take 1 tablet by mouth daily 90 tablet 3     No current facility-administered medications for this visit  No current outpatient prescriptions on file prior to visit  No current facility-administered medications on file prior to visit  He is allergic to augmentin es-600  [amoxicillin-pot clavulanate]; lansoprazole; and rabeprazole       Review of Systems   Constitutional: Negative for activity change, appetite change, chills, fatigue, fever and unexpected weight change  HENT: Negative for congestion, ear discharge, ear pain, postnasal drip, sinus pressure and sore throat  Eyes: Negative for discharge and visual disturbance  Respiratory: Negative for cough, shortness of breath and wheezing  Cardiovascular: Negative for chest pain, palpitations and leg swelling  Gastrointestinal: Negative for abdominal pain, constipation, diarrhea, nausea and vomiting  Endocrine: Negative for cold intolerance, heat intolerance, polydipsia and polyuria  Genitourinary: Negative for difficulty urinating and frequency  Musculoskeletal: Negative for arthralgias, back pain, joint swelling and myalgias  Skin: Negative for rash  Neurological: Negative for dizziness, weakness, light-headedness, numbness and headaches  Hematological: Negative for adenopathy  Psychiatric/Behavioral: Negative for behavioral problems, confusion, dysphoric mood, sleep disturbance and suicidal ideas  The patient is not nervous/anxious  Objective:      /66   Pulse 78   Temp (!) 97 1 °F (36 2 °C)   Ht 5' 7" (1 702 m)   Wt 94 9 kg (209 lb 3 2 oz)   SpO2 99%   BMI 32 77 kg/m²          Physical Exam   Constitutional: He is oriented to person, place, and time   He appears well-developed and well-nourished  No distress  HENT:   Head: Normocephalic and atraumatic  Mouth/Throat: Oropharynx is clear and moist    Eyes: Conjunctivae are normal  Pupils are equal, round, and reactive to light  Cardiovascular: Normal rate, regular rhythm and normal heart sounds  Exam reveals no gallop and no friction rub  No murmur heard  Pulmonary/Chest: Effort normal and breath sounds normal  No respiratory distress  He has no wheezes  He has no rales  He exhibits no tenderness  Musculoskeletal: He exhibits no edema or deformity  Neurological: He is alert and oriented to person, place, and time  Skin: No rash noted  He is not diaphoretic  Psychiatric: He has a normal mood and affect  His behavior is normal  Judgment and thought content normal    Nursing note and vitals reviewed

## 2018-04-24 LAB
ALBUMIN (HISTORICAL): 22.4 MG/DL
ALBUMIN CREATININE RATIO (HISTORICAL): 160.3 MG/G
ALBUMIN SERPL BCP-MCNC: 4 G/DL (ref 3.5–5.7)
ALP SERPL-CCNC: 57 IU/L (ref 55–165)
ALT SERPL W P-5'-P-CCNC: 11 IU/L (ref 7–26)
ANION GAP SERPL CALCULATED.3IONS-SCNC: 9.5 MM/L
AST SERPL W P-5'-P-CCNC: 16 U/L (ref 8–27)
BACTERIA UR QL AUTO: ABNORMAL
BASOPHILS # BLD AUTO: 0.1 X3/UL (ref 0–0.3)
BASOPHILS # BLD AUTO: 1 % (ref 0–2)
BILIRUB SERPL-MCNC: 0.6 MG/DL (ref 0.3–1)
BILIRUB UR QL STRIP: NEGATIVE
BUN SERPL-MCNC: 33 MG/DL (ref 7–25)
CALCIUM SERPL-MCNC: 9.4 MG/DL (ref 8.6–10.5)
CHLORIDE SERPL-SCNC: 104 MM/L (ref 98–107)
CLARITY UR: CLEAR
CO2 SERPL-SCNC: 30 MM/L (ref 21–31)
COLOR UR: YELLOW
CREAT SERPL-MCNC: 1.82 MG/DL (ref 0.7–1.3)
CREATININE, RANDOM URINE (HISTORICAL): 139.7 MG/DL
DEPRECATED RDW RBC AUTO: 14.3 % (ref 11.5–14.5)
EGFR (HISTORICAL): 36 GFR
EGFR AFRICAN AMERICAN (HISTORICAL): 43 GFR
EOSINOPHIL # BLD AUTO: 0.3 X3/UL (ref 0–0.5)
EOSINOPHIL NFR BLD AUTO: 4.9 % (ref 0–5)
GLUCOSE (HISTORICAL): 96 MG/DL (ref 65–99)
GLUCOSE UR STRIP-MCNC: NEGATIVE MG/DL
HCT VFR BLD AUTO: 42.2 % (ref 42–52)
HGB BLD-MCNC: 13.9 G/DL (ref 14–18)
HGB UR QL STRIP.AUTO: NEGATIVE
KETONES UR STRIP-MCNC: NEGATIVE MG/DL
LEUKOCYTE ESTERASE UR QL STRIP: NEGATIVE
LYMPHOCYTES # BLD AUTO: 1.4 X3/UL (ref 1.2–4.2)
LYMPHOCYTES NFR BLD AUTO: 19.1 % (ref 20.5–51.1)
MCH RBC QN AUTO: 30.5 PG (ref 26–34)
MCHC RBC AUTO-ENTMCNC: 32.9 G/DL (ref 31–36)
MCV RBC AUTO: 92.7 FL (ref 81–99)
MONOCYTES # BLD AUTO: 0.6 X3/UL (ref 0–1)
MONOCYTES NFR BLD AUTO: 9.1 % (ref 1.7–12)
MUCUS THREADS (HISTORICAL): ABNORMAL /HPF
NEUTROPHILS # BLD AUTO: 4.7 X3/UL (ref 1.4–6.5)
NEUTS SEG NFR BLD AUTO: 65.9 % (ref 42.2–75.2)
NITRITE UR QL STRIP: NEGATIVE
NON-SQ EPI CELLS URNS QL MICRO: ABNORMAL /HPF
OSMOLALITY, SERUM (HISTORICAL): 285 MOSM (ref 262–291)
PH UR STRIP.AUTO: 6 [PH] (ref 4.5–8)
PLATELET # BLD AUTO: 158 X3/UL (ref 130–400)
PMV BLD AUTO: 9.6 FL (ref 8.6–11.7)
POTASSIUM SERPL-SCNC: 4.5 MM/L (ref 3.5–5.5)
PROT UR STRIP-MCNC: ABNORMAL MG/DL
RBC # BLD AUTO: 4.55 X6/UL (ref 4.3–5.9)
RBC #/AREA URNS AUTO: ABNORMAL /HPF
SODIUM SERPL-SCNC: 139 MM/L (ref 134–143)
SP GR UR STRIP.AUTO: 1.02 (ref 1–1.03)
TOTAL PROTEIN (HISTORICAL): 7 G/DL (ref 6.4–8.9)
UROBILINOGEN UR QL STRIP.AUTO: 0.2 EU/DL (ref 0.2–8)
WBC # BLD AUTO: 7.1 X3/UL (ref 4.8–10.8)
WBC #/AREA URNS AUTO: ABNORMAL /HPF

## 2018-04-26 LAB
BETA-2 MICROGLOBULIN (HISTORICAL): 4.3 MG/L (ref 0.6–2.4)
BETA-2 MICROGLOBULIN, URINE (HISTORICAL): 6038 UG/L (ref 0–300)

## 2018-05-09 LAB
A/G RATIO (HISTORICAL): 1.1 (ref 0.7–1.7)
ALBUMIN ELP (HISTORICAL): 3.8 G/DL (ref 2.9–4.4)
ALPHA 1 (HISTORICAL): 0.3 G/DL (ref 0–0.4)
ALPHA 2 (HISTORICAL): 1 G/DL (ref 0.4–1)
BETA GLOBULIN, SERUM (HISTORICAL): 1.1 G/DL (ref 0.7–1.3)
GAMMA GLOBULIN (HISTORICAL): 1.2 G/DL (ref 0.4–1.8)
IMMUNOFIX RESULT, URINE (HISTORICAL): ABNORMAL
IMMUNOGLOBULIN A (HISTORICAL): 228 MG/DL (ref 61–437)
IMMUNOGLOBULIN G (HISTORICAL): 1088 MG/DL (ref 700–1600)
IMMUNOGLOBULIN M (HISTORICAL): 36 MG/DL (ref 15–143)
M-SPIKE (HISTORICAL): 0.7 G/DL
PLEASE NOTE: (HISTORICAL): ABNORMAL
TOT. GLOBULIN, SERUM (HISTORICAL): 3.5 G/DL (ref 2.2–3.9)
TOTAL PROTEIN (HISTORICAL): 7.3 G/DL (ref 6–8.5)

## 2018-05-10 LAB
ALBUMIN (HISTORICAL): 63.5 %
ALPHA 1 URINE (HISTORICAL): 4.7 %
ALPHA 2 URINE (HISTORICAL): 6.6 %
GAMMA GLOBULIN URINE (HISTORICAL): 10.7 %
IMMUNOFIX RESULT, URINE (HISTORICAL): NORMAL
M-SPIKE %, URINE (HISTORICAL): NORMAL %
PLEASE NOTE: (HISTORICAL): NORMAL
PROT UR-MCNC: 54.9 MG/DL
URINE BETA GLOBULIN (HISTORICAL): 14.4 %

## 2018-09-27 ENCOUNTER — OFFICE VISIT (OUTPATIENT)
Dept: FAMILY MEDICINE CLINIC | Facility: CLINIC | Age: 83
End: 2018-09-27
Payer: MEDICARE

## 2018-09-27 VITALS
BODY MASS INDEX: 32.65 KG/M2 | WEIGHT: 208 LBS | HEART RATE: 64 BPM | HEIGHT: 67 IN | TEMPERATURE: 96.2 F | SYSTOLIC BLOOD PRESSURE: 122 MMHG | DIASTOLIC BLOOD PRESSURE: 74 MMHG | OXYGEN SATURATION: 98 %

## 2018-09-27 DIAGNOSIS — I10 ESSENTIAL HYPERTENSION: Primary | ICD-10-CM

## 2018-09-27 DIAGNOSIS — N18.9 CHRONIC KIDNEY DISEASE, UNSPECIFIED CKD STAGE: ICD-10-CM

## 2018-09-27 DIAGNOSIS — E78.00 HYPERCHOLESTEROLEMIA: ICD-10-CM

## 2018-09-27 DIAGNOSIS — Z23 NEEDS FLU SHOT: ICD-10-CM

## 2018-09-27 PROCEDURE — 99214 OFFICE O/P EST MOD 30 MIN: CPT | Performed by: FAMILY MEDICINE

## 2018-09-27 PROCEDURE — G0008 ADMIN INFLUENZA VIRUS VAC: HCPCS

## 2018-09-27 PROCEDURE — 90662 IIV NO PRSV INCREASED AG IM: CPT

## 2018-09-27 RX ORDER — LOSARTAN POTASSIUM AND HYDROCHLOROTHIAZIDE 12.5; 1 MG/1; MG/1
1 TABLET ORAL DAILY
Qty: 90 TABLET | Refills: 3 | Status: SHIPPED | OUTPATIENT
Start: 2018-09-27 | End: 2019-02-19

## 2018-09-27 NOTE — PROGRESS NOTES
Assessment/Plan:     Diagnoses and all orders for this visit:    Essential hypertension  -     losartan-hydrochlorothiazide (HYZAAR) 100-12 5 MG per tablet; Take 1 tablet by mouth daily    Hypercholesterolemia    Needs flu shot  -     Flu Vaccine High Dose Split Preservative Free IM    Chronic kidney disease, unspecified CKD stage        Stable  Blood work will be done through Nephrology next month  Changed to losartan-HCTZ due to recent recall of valsartan  Monitor blood pressure  Flu vaccine  Follow-up 6 months  Subjective:      Patient ID: Jasmin Bass is a 80 y o  male  He is here for six-month checkup  No acute complaints  CKD - follows up with Dr Shanna Rodriguez  Has blood work scheduled for October  HTN - is on valsartan - recent recall  Asking about changing medications  Lipids- last checked in March  Well controlled  He is on a statin  Would like a flu shot  The following portions of the patient's history were reviewed and updated as appropriate:   He  has a past medical history of Chronic kidney disease; Dissecting aortic aneurysm (any part), abdominal ([address] ); and Gout  He   Patient Active Problem List    Diagnosis Date Noted    Needs flu shot 09/27/2018    CKD (chronic kidney disease) 07/28/2014    Hypercholesterolemia 06/18/2013    Gout 11/26/2012    Hypertension 11/26/2012    Coronary atherosclerosis 03/26/2012     He  has a past surgical history that includes Cholecystectomy and Umbilical hernia repair  His family history includes No Known Problems in his family  He  reports that he has quit smoking  He has never used smokeless tobacco  He reports that he does not drink alcohol  His drug history is not on file    Current Outpatient Prescriptions   Medication Sig Dispense Refill    amLODIPine (NORVASC) 5 mg tablet Take 1 tablet (5 mg total) by mouth daily 90 tablet 3    aspirin (ADULT ASPIRIN EC LOW STRENGTH) 81 mg EC tablet Take by mouth      atorvastatin (LIPITOR) 10 mg tablet Take 1 tablet (10 mg total) by mouth daily 90 tablet 3    cholecalciferol (VITAMIN D3) 1,000 units tablet Take 1 tablet by mouth once a week      losartan-hydrochlorothiazide (HYZAAR) 100-12 5 MG per tablet Take 1 tablet by mouth daily 90 tablet 3    metoprolol succinate (TOPROL-XL) 100 mg 24 hr tablet Take 1 tablet (100 mg total) by mouth daily 90 tablet 3    potassium chloride (K-DUR) 10 mEq tablet Take 1 tablet (10 mEq total) by mouth daily 90 tablet 3    ULORIC 40 MG tablet Take 1 tablet (40 mg total) by mouth daily 90 tablet 3     No current facility-administered medications for this visit  Current Outpatient Prescriptions on File Prior to Visit   Medication Sig    amLODIPine (NORVASC) 5 mg tablet Take 1 tablet (5 mg total) by mouth daily    aspirin (ADULT ASPIRIN EC LOW STRENGTH) 81 mg EC tablet Take by mouth    atorvastatin (LIPITOR) 10 mg tablet Take 1 tablet (10 mg total) by mouth daily    cholecalciferol (VITAMIN D3) 1,000 units tablet Take 1 tablet by mouth once a week    metoprolol succinate (TOPROL-XL) 100 mg 24 hr tablet Take 1 tablet (100 mg total) by mouth daily    potassium chloride (K-DUR) 10 mEq tablet Take 1 tablet (10 mEq total) by mouth daily    ULORIC 40 MG tablet Take 1 tablet (40 mg total) by mouth daily    [DISCONTINUED] valsartan-hydrochlorothiazide (DIOVAN-HCT) 320-12 5 MG per tablet Take 1 tablet by mouth daily     No current facility-administered medications on file prior to visit  He is allergic to augmentin es-600  [amoxicillin-pot clavulanate]; lansoprazole; and rabeprazole       Review of Systems   Constitutional: Negative for activity change, appetite change, chills, fatigue, fever and unexpected weight change  HENT: Negative for congestion, ear discharge, ear pain, postnasal drip, sinus pressure and sore throat  Eyes: Negative for discharge and visual disturbance  Respiratory: Negative for cough, shortness of breath and wheezing  Cardiovascular: Negative for chest pain, palpitations and leg swelling  Gastrointestinal: Negative for abdominal pain, constipation, diarrhea, nausea and vomiting  Endocrine: Negative for cold intolerance, heat intolerance, polydipsia and polyuria  Genitourinary: Negative for difficulty urinating and frequency  Musculoskeletal: Negative for arthralgias, back pain, joint swelling and myalgias  Skin: Negative for rash  Neurological: Negative for dizziness, weakness, light-headedness, numbness and headaches  Hematological: Negative for adenopathy  Psychiatric/Behavioral: Negative for behavioral problems, confusion, dysphoric mood, sleep disturbance and suicidal ideas  The patient is not nervous/anxious  Objective:      /74   Pulse 64   Temp (!) 96 2 °F (35 7 °C)   Ht 5' 7" (1 702 m)   Wt 94 3 kg (208 lb)   SpO2 98%   BMI 32 58 kg/m²          Physical Exam   Constitutional: He is oriented to person, place, and time  He appears well-developed and well-nourished  No distress  HENT:   Head: Normocephalic and atraumatic  Cardiovascular: Normal rate, regular rhythm and normal heart sounds  Exam reveals no gallop and no friction rub  No murmur heard  Pulmonary/Chest: Effort normal and breath sounds normal  No respiratory distress  He has no wheezes  He has no rales  He exhibits no tenderness  Musculoskeletal: He exhibits no edema  Neurological: He is alert and oriented to person, place, and time  Skin: He is not diaphoretic  Psychiatric: He has a normal mood and affect  His behavior is normal  Judgment and thought content normal    Nursing note and vitals reviewed

## 2019-02-19 ENCOUNTER — TELEPHONE (OUTPATIENT)
Dept: FAMILY MEDICINE CLINIC | Facility: CLINIC | Age: 84
End: 2019-02-19

## 2019-02-19 DIAGNOSIS — I10 ESSENTIAL HYPERTENSION: Primary | ICD-10-CM

## 2019-02-19 RX ORDER — LISINOPRIL AND HYDROCHLOROTHIAZIDE 25; 20 MG/1; MG/1
1 TABLET ORAL DAILY
Qty: 30 TABLET | Refills: 5 | Status: SHIPPED | OUTPATIENT
Start: 2019-02-19 | End: 2019-03-28 | Stop reason: SDUPTHER

## 2019-02-19 NOTE — TELEPHONE ENCOUNTER
Patient's wife notified when she called to see why Robe Jordan called her that there was a prescription there for him  He has an appointment on 3/28, if he notices any changes they will call us

## 2019-03-28 ENCOUNTER — OFFICE VISIT (OUTPATIENT)
Dept: FAMILY MEDICINE CLINIC | Facility: CLINIC | Age: 84
End: 2019-03-28
Payer: MEDICARE

## 2019-03-28 ENCOUNTER — TELEPHONE (OUTPATIENT)
Dept: FAMILY MEDICINE CLINIC | Facility: CLINIC | Age: 84
End: 2019-03-28

## 2019-03-28 VITALS
WEIGHT: 212 LBS | TEMPERATURE: 97.6 F | OXYGEN SATURATION: 94 % | DIASTOLIC BLOOD PRESSURE: 76 MMHG | HEART RATE: 52 BPM | RESPIRATION RATE: 16 BRPM | SYSTOLIC BLOOD PRESSURE: 144 MMHG | BODY MASS INDEX: 33.27 KG/M2 | HEIGHT: 67 IN

## 2019-03-28 DIAGNOSIS — I10 ESSENTIAL HYPERTENSION: Primary | ICD-10-CM

## 2019-03-28 DIAGNOSIS — M10.9 GOUT, UNSPECIFIED CAUSE, UNSPECIFIED CHRONICITY, UNSPECIFIED SITE: ICD-10-CM

## 2019-03-28 DIAGNOSIS — M1A.0790 CHRONIC GOUT OF FOOT, UNSPECIFIED CAUSE, UNSPECIFIED LATERALITY: ICD-10-CM

## 2019-03-28 DIAGNOSIS — E66.09 CLASS 1 OBESITY DUE TO EXCESS CALORIES WITHOUT SERIOUS COMORBIDITY WITH BODY MASS INDEX (BMI) OF 33.0 TO 33.9 IN ADULT: ICD-10-CM

## 2019-03-28 DIAGNOSIS — E78.00 HYPERCHOLESTEROLEMIA: ICD-10-CM

## 2019-03-28 DIAGNOSIS — N18.9 CHRONIC KIDNEY DISEASE, UNSPECIFIED CKD STAGE: ICD-10-CM

## 2019-03-28 PROCEDURE — 99214 OFFICE O/P EST MOD 30 MIN: CPT | Performed by: FAMILY MEDICINE

## 2019-03-28 RX ORDER — AMLODIPINE BESYLATE 5 MG/1
5 TABLET ORAL DAILY
Qty: 90 TABLET | Refills: 3 | Status: SHIPPED | OUTPATIENT
Start: 2019-03-28 | End: 2019-09-30 | Stop reason: SDUPTHER

## 2019-03-28 RX ORDER — ATORVASTATIN CALCIUM 10 MG/1
10 TABLET, FILM COATED ORAL DAILY
Qty: 90 TABLET | Refills: 3 | Status: SHIPPED | OUTPATIENT
Start: 2019-03-28 | End: 2019-09-30 | Stop reason: SDUPTHER

## 2019-03-28 RX ORDER — FEBUXOSTAT 40 MG/1
40 TABLET ORAL DAILY
Qty: 90 TABLET | Refills: 3 | Status: SHIPPED | OUTPATIENT
Start: 2019-03-28 | End: 2019-09-30 | Stop reason: ALTCHOICE

## 2019-03-28 RX ORDER — LISINOPRIL AND HYDROCHLOROTHIAZIDE 25; 20 MG/1; MG/1
1 TABLET ORAL DAILY
Qty: 90 TABLET | Refills: 3 | Status: SHIPPED | OUTPATIENT
Start: 2019-03-28 | End: 2019-03-28 | Stop reason: SDUPTHER

## 2019-03-28 RX ORDER — POTASSIUM CHLORIDE 750 MG/1
10 TABLET, FILM COATED, EXTENDED RELEASE ORAL DAILY
Qty: 90 TABLET | Refills: 3 | Status: SHIPPED | OUTPATIENT
Start: 2019-03-28 | End: 2019-07-01 | Stop reason: ALTCHOICE

## 2019-03-28 RX ORDER — POTASSIUM CHLORIDE 750 MG/1
10 TABLET, FILM COATED, EXTENDED RELEASE ORAL DAILY
Qty: 90 TABLET | Refills: 3 | Status: SHIPPED | OUTPATIENT
Start: 2019-03-28 | End: 2019-03-28 | Stop reason: SDUPTHER

## 2019-03-28 RX ORDER — METOPROLOL SUCCINATE 100 MG/1
100 TABLET, EXTENDED RELEASE ORAL DAILY
Qty: 90 TABLET | Refills: 3 | Status: SHIPPED | OUTPATIENT
Start: 2019-03-28 | End: 2019-09-30 | Stop reason: SDUPTHER

## 2019-03-28 RX ORDER — FEBUXOSTAT 40 MG/1
40 TABLET ORAL DAILY
Qty: 90 TABLET | Refills: 3 | Status: SHIPPED | OUTPATIENT
Start: 2019-03-28 | End: 2019-03-28 | Stop reason: SDUPTHER

## 2019-03-28 RX ORDER — METOPROLOL SUCCINATE 100 MG/1
100 TABLET, EXTENDED RELEASE ORAL DAILY
Qty: 90 TABLET | Refills: 3 | Status: SHIPPED | OUTPATIENT
Start: 2019-03-28 | End: 2019-03-28 | Stop reason: SDUPTHER

## 2019-03-28 RX ORDER — AMLODIPINE BESYLATE 5 MG/1
5 TABLET ORAL DAILY
Qty: 90 TABLET | Refills: 3 | Status: SHIPPED | OUTPATIENT
Start: 2019-03-28 | End: 2019-03-28 | Stop reason: SDUPTHER

## 2019-03-28 RX ORDER — ATORVASTATIN CALCIUM 10 MG/1
10 TABLET, FILM COATED ORAL DAILY
Qty: 90 TABLET | Refills: 3 | Status: SHIPPED | OUTPATIENT
Start: 2019-03-28 | End: 2019-03-28 | Stop reason: SDUPTHER

## 2019-03-28 RX ORDER — LISINOPRIL AND HYDROCHLOROTHIAZIDE 25; 20 MG/1; MG/1
1 TABLET ORAL DAILY
Qty: 90 TABLET | Refills: 3 | Status: SHIPPED | OUTPATIENT
Start: 2019-03-28 | End: 2019-05-15 | Stop reason: SDUPTHER

## 2019-03-28 NOTE — PROGRESS NOTES
Assessment/Plan:    No problem-specific Assessment & Plan notes found for this encounter  Diagnoses and all orders for this visit:    Essential hypertension  -     Discontinue: amLODIPine (NORVASC) 5 mg tablet; Take 1 tablet (5 mg total) by mouth daily  -     Discontinue: potassium chloride (K-DUR) 10 mEq tablet; Take 1 tablet (10 mEq total) by mouth daily  -     Discontinue: metoprolol succinate (TOPROL-XL) 100 mg 24 hr tablet; Take 1 tablet (100 mg total) by mouth daily  -     Discontinue: lisinopril-hydrochlorothiazide (PRINZIDE,ZESTORETIC) 20-25 MG per tablet; Take 1 tablet by mouth daily  -     amLODIPine (NORVASC) 5 mg tablet; Take 1 tablet (5 mg total) by mouth daily  -     lisinopril-hydrochlorothiazide (PRINZIDE,ZESTORETIC) 20-25 MG per tablet; Take 1 tablet by mouth daily  -     metoprolol succinate (TOPROL-XL) 100 mg 24 hr tablet; Take 1 tablet (100 mg total) by mouth daily  -     potassium chloride (K-DUR) 10 mEq tablet; Take 1 tablet (10 mEq total) by mouth daily    Hypercholesterolemia  -     Discontinue: atorvastatin (LIPITOR) 10 mg tablet; Take 1 tablet (10 mg total) by mouth daily  -     atorvastatin (LIPITOR) 10 mg tablet; Take 1 tablet (10 mg total) by mouth daily    Gout, unspecified cause, unspecified chronicity, unspecified site  -     Discontinue: ULORIC 40 MG tablet; Take 1 tablet (40 mg total) by mouth daily  -     ULORIC 40 MG tablet; Take 1 tablet (40 mg total) by mouth daily    Chronic kidney disease, unspecified CKD stage    Chronic gout of foot, unspecified cause, unspecified laterality    Class 1 obesity due to excess calories without serious comorbidity with body mass index (BMI) of 33 0 to 33 9 in adult        Continue same medications- will get copy of labs from his nephrologist   Will also get records from vascular  BMI Counseling: Body mass index is 33 2 kg/m²  Discussed the patient's BMI with him  The BMI is above average   BMI counseling and education was provided to the patient  Nutrition recommendations include reducing portion sizes  increase exercise  Subjective:      Patient ID: Ana Laura Kearney is a 80 y o  male  Patient has a history of hypertension  We changed from valsartan-HCTZ to losartan-HCTZ, then had to be switched to [facility]  at his last appointment due to the recall  His blood pressure is elevated slightly but better on recheck  He states at home it typically runs in the 691X to 815 systolic  Denies any chest pain or shortness of breath  He does have a history of an aortic aneurysm we do not have any records on file regarding this  He [facility] a [facility] at [facility]  CKD - he sees Dr Janes Ken, has labs ordered for April  Lipids - has labs ordered thru his nephrologist    Gout stable on Uloric  No recent flare up    No acute complaints today  The following portions of the patient's history were reviewed and updated as appropriate:   He  has a past medical history of Chronic kidney disease, Dissecting aortic aneurysm (any part), abdominal ([address] ), and Gout  He   Patient Active Problem List    Diagnosis Date Noted    Needs flu shot 09/27/2018    CKD (chronic kidney disease) 07/28/2014    Hypercholesterolemia 06/18/2013    Gout 11/26/2012    Hypertension 11/26/2012    Coronary atherosclerosis 03/26/2012     He  has a past surgical history that includes Cholecystectomy and Umbilical hernia repair  His family history includes No Known Problems in his family  He  reports that he has quit smoking  He has never used smokeless tobacco  He reports that he does not drink alcohol  His drug history is not on file    Current Outpatient Medications   Medication Sig Dispense Refill    amLODIPine (NORVASC) 5 mg tablet Take 1 tablet (5 mg total) by mouth daily 90 tablet 3    aspirin (ADULT ASPIRIN EC LOW STRENGTH) 81 mg EC tablet Take by mouth      atorvastatin (LIPITOR) 10 mg tablet Take 1 tablet (10 mg total) by mouth daily 90 tablet 3  cholecalciferol (VITAMIN D3) 1,000 units tablet Take 1 tablet by mouth once a week      lisinopril-hydrochlorothiazide (PRINZIDE,ZESTORETIC) 20-25 MG per tablet Take 1 tablet by mouth daily 90 tablet 3    metoprolol succinate (TOPROL-XL) 100 mg 24 hr tablet Take 1 tablet (100 mg total) by mouth daily 90 tablet 3    potassium chloride (K-DUR) 10 mEq tablet Take 1 tablet (10 mEq total) by mouth daily 90 tablet 3    ULORIC 40 MG tablet Take 1 tablet (40 mg total) by mouth daily 90 tablet 3     No current facility-administered medications for this visit  Current Outpatient Medications on File Prior to Visit   Medication Sig    aspirin (ADULT ASPIRIN EC LOW STRENGTH) 81 mg EC tablet Take by mouth    cholecalciferol (VITAMIN D3) 1,000 units tablet Take 1 tablet by mouth once a week    [DISCONTINUED] amLODIPine (NORVASC) 5 mg tablet Take 1 tablet (5 mg total) by mouth daily    [DISCONTINUED] atorvastatin (LIPITOR) 10 mg tablet Take 1 tablet (10 mg total) by mouth daily    [DISCONTINUED] lisinopril-hydrochlorothiazide (PRINZIDE,ZESTORETIC) 20-25 MG per tablet Take 1 tablet by mouth daily    [DISCONTINUED] metoprolol succinate (TOPROL-XL) 100 mg 24 hr tablet Take 1 tablet (100 mg total) by mouth daily    [DISCONTINUED] potassium chloride (K-DUR) 10 mEq tablet Take 1 tablet (10 mEq total) by mouth daily    [DISCONTINUED] ULORIC 40 MG tablet Take 1 tablet (40 mg total) by mouth daily     No current facility-administered medications on file prior to visit  He is allergic to augmentin es-600  [amoxicillin-pot clavulanate]; lansoprazole; and rabeprazole       Review of Systems   Constitutional: Negative for activity change, appetite change, fatigue and unexpected weight change  Respiratory: Negative for chest tightness and shortness of breath  Cardiovascular: Negative for chest pain and leg swelling  Gastrointestinal: Negative for abdominal pain  Neurological: Negative for headaches  Objective:      /76   Pulse (!) 52   Temp 97 6 °F (36 4 °C) (Tympanic)   Resp 16   Ht 5' 7" (1 702 m)   Wt 96 2 kg (212 lb)   SpO2 94%   BMI 33 20 kg/m²          Physical Exam   Constitutional: He is oriented to person, place, and time  He appears well-developed and well-nourished  No distress  HENT:   Head: Normocephalic and atraumatic  Cardiovascular: Normal rate, regular rhythm and normal heart sounds  Exam reveals no gallop and no friction rub  No murmur heard  Pulmonary/Chest: Effort normal and breath sounds normal  No respiratory distress  He has no wheezes  He has no rales  He exhibits no tenderness  Musculoskeletal: He exhibits edema (Trace edema ankle)  Neurological: He is alert and oriented to person, place, and time  Skin: He is not diaphoretic  Psychiatric: He has a normal mood and affect  His behavior is normal  Judgment and thought content normal    Nursing note and vitals reviewed

## 2019-05-15 DIAGNOSIS — I10 ESSENTIAL HYPERTENSION: ICD-10-CM

## 2019-05-15 RX ORDER — LISINOPRIL AND HYDROCHLOROTHIAZIDE 25; 20 MG/1; MG/1
1 TABLET ORAL DAILY
Qty: 90 TABLET | Refills: 3 | Status: SHIPPED | OUTPATIENT
Start: 2019-05-15 | End: 2019-09-30 | Stop reason: SDUPTHER

## 2019-06-28 ENCOUNTER — TELEPHONE (OUTPATIENT)
Dept: FAMILY MEDICINE CLINIC | Facility: CLINIC | Age: 84
End: 2019-06-28

## 2019-06-28 NOTE — TELEPHONE ENCOUNTER
Pharmacist from [facility] called and stated that [facility] has been discontinued by the   Equivent to what patient is on pharmacist stated was [facility]  Is this ok to change?       [phone] ref# [id number]

## 2019-07-01 ENCOUNTER — TELEPHONE (OUTPATIENT)
Dept: FAMILY MEDICINE CLINIC | Facility: CLINIC | Age: 84
End: 2019-07-01

## 2019-07-01 DIAGNOSIS — I10 ESSENTIAL HYPERTENSION: Primary | ICD-10-CM

## 2019-07-01 RX ORDER — POTASSIUM CHLORIDE 750 MG/1
10 CAPSULE, EXTENDED RELEASE ORAL 2 TIMES DAILY
Qty: 90 CAPSULE | Refills: 3 | Status: SHIPPED | OUTPATIENT
Start: 2019-07-01 | End: 2019-07-01

## 2019-07-05 RX ORDER — POTASSIUM CHLORIDE 750 MG/1
10 CAPSULE, EXTENDED RELEASE ORAL DAILY
Qty: 90 CAPSULE | Refills: 3 | Status: SHIPPED | OUTPATIENT
Start: 2019-07-05 | End: 2019-09-30 | Stop reason: SDUPTHER

## 2019-09-30 ENCOUNTER — OFFICE VISIT (OUTPATIENT)
Dept: FAMILY MEDICINE CLINIC | Facility: CLINIC | Age: 84
End: 2019-09-30
Payer: MEDICARE

## 2019-09-30 VITALS
HEIGHT: 67 IN | OXYGEN SATURATION: 96 % | RESPIRATION RATE: 16 BRPM | DIASTOLIC BLOOD PRESSURE: 80 MMHG | HEART RATE: 70 BPM | WEIGHT: 204 LBS | TEMPERATURE: 95.5 F | BODY MASS INDEX: 32.02 KG/M2 | SYSTOLIC BLOOD PRESSURE: 126 MMHG

## 2019-09-30 DIAGNOSIS — N18.9 CHRONIC KIDNEY DISEASE, UNSPECIFIED CKD STAGE: ICD-10-CM

## 2019-09-30 DIAGNOSIS — E78.00 HYPERCHOLESTEROLEMIA: ICD-10-CM

## 2019-09-30 DIAGNOSIS — Q18.1 CYST ON EAR: ICD-10-CM

## 2019-09-30 DIAGNOSIS — Z23 NEED FOR INFLUENZA VACCINATION: Primary | ICD-10-CM

## 2019-09-30 DIAGNOSIS — I10 ESSENTIAL HYPERTENSION: ICD-10-CM

## 2019-09-30 DIAGNOSIS — Z00.00 MEDICARE ANNUAL WELLNESS VISIT, SUBSEQUENT: ICD-10-CM

## 2019-09-30 DIAGNOSIS — M1A.0790 CHRONIC GOUT OF FOOT, UNSPECIFIED CAUSE, UNSPECIFIED LATERALITY: ICD-10-CM

## 2019-09-30 DIAGNOSIS — Z23 NEED FOR SHINGLES VACCINE: ICD-10-CM

## 2019-09-30 PROCEDURE — 90662 IIV NO PRSV INCREASED AG IM: CPT | Performed by: FAMILY MEDICINE

## 2019-09-30 PROCEDURE — G0439 PPPS, SUBSEQ VISIT: HCPCS | Performed by: FAMILY MEDICINE

## 2019-09-30 PROCEDURE — G0008 ADMIN INFLUENZA VIRUS VAC: HCPCS | Performed by: FAMILY MEDICINE

## 2019-09-30 PROCEDURE — 99214 OFFICE O/P EST MOD 30 MIN: CPT | Performed by: FAMILY MEDICINE

## 2019-09-30 RX ORDER — FEBUXOSTAT 40 MG/1
40 TABLET, FILM COATED ORAL DAILY
Qty: 90 TABLET | Refills: 3 | Status: SHIPPED | OUTPATIENT
Start: 2019-09-30 | End: 2020-09-29

## 2019-09-30 RX ORDER — LISINOPRIL AND HYDROCHLOROTHIAZIDE 25; 20 MG/1; MG/1
1 TABLET ORAL DAILY
Qty: 90 TABLET | Refills: 3 | Status: SHIPPED | OUTPATIENT
Start: 2019-09-30

## 2019-09-30 RX ORDER — AMLODIPINE BESYLATE 5 MG/1
5 TABLET ORAL DAILY
Qty: 90 TABLET | Refills: 3 | Status: SHIPPED | OUTPATIENT
Start: 2019-09-30

## 2019-09-30 RX ORDER — ATORVASTATIN CALCIUM 10 MG/1
10 TABLET, FILM COATED ORAL DAILY
Qty: 90 TABLET | Refills: 3 | Status: SHIPPED | OUTPATIENT
Start: 2019-09-30

## 2019-09-30 RX ORDER — CEPHALEXIN 500 MG/1
500 CAPSULE ORAL EVERY 6 HOURS SCHEDULED
Qty: 28 CAPSULE | Refills: 0 | Status: SHIPPED | OUTPATIENT
Start: 2019-09-30 | End: 2019-10-07

## 2019-09-30 RX ORDER — METOPROLOL SUCCINATE 100 MG/1
100 TABLET, EXTENDED RELEASE ORAL DAILY
Qty: 90 TABLET | Refills: 3 | Status: SHIPPED | OUTPATIENT
Start: 2019-09-30

## 2019-09-30 RX ORDER — POTASSIUM CHLORIDE 750 MG/1
10 CAPSULE, EXTENDED RELEASE ORAL DAILY
Qty: 90 CAPSULE | Refills: 3 | Status: SHIPPED | OUTPATIENT
Start: 2019-09-30

## 2019-09-30 NOTE — PROGRESS NOTES
Assessment and Plan:     Problem List Items Addressed This Visit        Cardiovascular and Mediastinum    Hypertension    Relevant Medications    amLODIPine (NORVASC) 5 mg tablet    lisinopril-hydrochlorothiazide (PRINZIDE,ZESTORETIC) 20-25 MG per tablet    metoprolol succinate (TOPROL-XL) 100 mg 24 hr tablet    potassium chloride (MICRO-K) 10 MEQ CR capsule       Nervous and Auditory    Cyst on ear    Relevant Medications    cephalexin (KEFLEX) 500 mg capsule       Genitourinary    CKD (chronic kidney disease)       Other    Gout    Relevant Medications    febuxostat (ULORIC) 40 mg tablet    Hypercholesterolemia    Relevant Medications    atorvastatin (LIPITOR) 10 mg tablet    Need for influenza vaccination - Primary    Relevant Orders    influenza vaccine, 0141-0592, high-dose, PF 0 5 mL (FLUZONE HIGH-DOSE) (Completed)      Other Visit Diagnoses     Need for shingles vaccine        Relevant Medications    Zoster Vac Recomb Adjuvanted ([address]) 50 MCG/0 5ML SUSR    Medicare annual wellness visit, subsequent               Preventive health issues were discussed with patient, and age appropriate screening tests were ordered as noted in patient's After Visit Summary  Personalized health advice and appropriate referrals for health education or preventive services given if needed, as noted in patient's After Visit Summary  History of Present Illness:     Patient presents for Welcome to Medicare visit       Patient Care Team:  Hali Moya MD as PCP - Ilene Carver MD       Problem List:     Patient Active Problem List   Diagnosis    CKD (chronic kidney disease)    Coronary atherosclerosis    Gout    Hypercholesterolemia    Hypertension    Needs flu shot    Need for influenza vaccination    Cyst on ear      Past Medical and Surgical History:     Past Medical History:   Diagnosis Date    Chronic kidney disease     stage 3  last assessed 14 sept 2017    Dissecting aortic aneurysm (any part), abdominal ([address] )     Gout      Past Surgical History:   Procedure Laterality Date    CHOLECYSTECTOMY      UMBILICAL HERNIA REPAIR        Family History:     Family History   Problem Relation Age of Onset    No Known Problems Family         sibling (not specified)      Social History:     Social History     Socioeconomic History    Marital status: /Civil Union     Spouse name: None    Number of children: None    Years of education: None    Highest education level: None   Occupational History    None   Social Needs    Financial resource strain: None    Food insecurity:     Worry: None     Inability: None    Transportation needs:     Medical: None     Non-medical: None   Tobacco Use    Smoking status: Former Smoker    Smokeless tobacco: Never Used   Substance and Sexual Activity    Alcohol use: No    Drug use: None    Sexual activity: None   Lifestyle    Physical activity:     Days per week: None     Minutes per session: None    Stress: None   Relationships    Social connections:     Talks on phone: None     Gets together: None     Attends [employer] service: None     Active member of club or organization: None     Attends meetings of clubs or organizations: None     Relationship status: None    Intimate partner violence:     Fear of current or ex partner: None     Emotionally abused: None     Physically abused: None     Forced sexual activity: None   Other Topics Concern    None   Social History Narrative    Caffeine use      Medications and Allergies:     Current Outpatient Medications   Medication Sig Dispense Refill    amLODIPine (NORVASC) 5 mg tablet Take 1 tablet (5 mg total) by mouth daily 90 tablet 3    aspirin (ADULT ASPIRIN EC LOW STRENGTH) 81 mg EC tablet Take by mouth      atorvastatin (LIPITOR) 10 mg tablet Take 1 tablet (10 mg total) by mouth daily 90 tablet 3    cholecalciferol (VITAMIN D3) 1,000 units tablet Take 1 tablet by mouth once a week      lisinopril-hydrochlorothiazide (PRINZIDE,ZESTORETIC) 20-25 MG per tablet Take 1 tablet by mouth daily 90 tablet 3    metoprolol succinate (TOPROL-XL) 100 mg 24 hr tablet Take 1 tablet (100 mg total) by mouth daily 90 tablet 3    potassium chloride (MICRO-K) 10 MEQ CR capsule Take 1 capsule (10 mEq total) by mouth daily 90 capsule 3    cephalexin (KEFLEX) 500 mg capsule Take 1 capsule (500 mg total) by mouth every 6 (six) hours for 7 days 28 capsule 0    febuxostat (ULORIC) 40 mg tablet Take 1 tablet (40 mg total) by mouth daily 90 tablet 3    Zoster Vac Recomb Adjuvanted (SHINGRIX) 50 MCG/0 5ML SUSR Inject 0 5 mL into a muscle once for 1 dose Repeat dose in 2 to 6 months 1 each 1     No current facility-administered medications for this visit  Allergies   Allergen Reactions    Augmentin Es-600  [Amoxicillin-Pot Clavulanate]     Lansoprazole     Rabeprazole       Immunizations:     Immunization History   Administered Date(s) Administered    INFLUENZA 08/18/2014, 09/09/2015, 09/09/2016    Influenza Split High Dose Preservative Free IM 09/20/2013, 08/18/2014, 09/09/2016, 09/14/2017    Influenza TIV (IM) 09/20/2013, 09/09/2015    Influenza, high dose seasonal 0 5 mL 09/27/2018, 09/30/2019    Pneumococcal Conjugate 13-Valent 03/09/2016    Pneumococcal Polysaccharide PPV23 11/18/2004      Health Maintenance: There are no preventive care reminders to display for this patient  There are no preventive care reminders to display for this patient  Medicare Screening Tests and Risk Assessments:     Cedric Sullivan is here for his Initial Wellness visit  Health Risk Assessment:   Patient rates overall health as excellent  Patient feels that their physical health rating is same  Eyesight was rated as same  Hearing was rated as same  Patient feels that their emotional and mental health rating is same  Pain experienced in the last 7 days has been none   Patient states that he has experienced no weight loss or gain in last 6 months  Depression Screening:   PHQ-2 Score: 0      Fall Risk Screening: In the past year, patient has experienced: no history of falling in past year      Home Safety:  Patient does not have trouble with stairs inside or outside of their home  Patient has working smoke alarms and has working carbon monoxide detector  Home safety hazards include: none  Nutrition:   Current diet is Regular  Medications:   Patient is currently taking over-the-counter supplements  OTC medications include: see medication list  Patient is able to manage medications  Activities of Daily Living (ADLs)/Instrumental Activities of Daily Living (IADLs):   Walk and transfer into and out of bed and chair?: Yes  Dress and groom yourself?: Yes    Bathe or shower yourself?: Yes    Feed yourself? Yes  Do your laundry/housekeeping?: Yes  Manage your money, pay your bills and track your expenses?: Yes  Make your own meals?: Yes    Do your own shopping?: Yes    Previous Hospitalizations:   Any hospitalizations or ED visits within the last 12 months?: No      Advance Care Planning:   Living will: Yes    Durable POA for healthcare:  Yes    Advanced directive: Yes    Advanced directive counseling given: No    Five wishes given: No      PREVENTIVE SCREENINGS      Cardiovascular Screening:    General: Screening Not Indicated and History Lipid Disorder      Diabetes Screening:       Due for: Blood Glucose      Colorectal Cancer Screening:     General: Screening Not Indicated      Prostate Cancer Screening:    General: Screening Not Indicated      Osteoporosis Screening:    General: Screening Not Indicated      Abdominal Aortic Aneurysm (AAA) Screening:    Risk factors include: tobacco use        General: Screening Not Indicated and History AAA      Lung Cancer Screening:     General: Screening Not Indicated      Hepatitis C Screening:    General: Screening Not Indicated        Neno Hamilton MD

## 2019-09-30 NOTE — PROGRESS NOTES
Assessment/Plan:    No problem-specific Assessment & Plan notes found for this encounter  Problem List Items Addressed This Visit        Cardiovascular and Mediastinum    Hypertension    Relevant Medications    amLODIPine (NORVASC) 5 mg tablet    lisinopril-hydrochlorothiazide (PRINZIDE,ZESTORETIC) 20-25 MG per tablet    metoprolol succinate (TOPROL-XL) 100 mg 24 hr tablet    potassium chloride (MICRO-K) 10 MEQ CR capsule       Nervous and Auditory    Cyst on ear    Relevant Medications    cephalexin (KEFLEX) 500 mg capsule       Genitourinary    CKD (chronic kidney disease)       Other    Gout    Relevant Medications    febuxostat (ULORIC) 40 mg tablet    Hypercholesterolemia    Relevant Medications    atorvastatin (LIPITOR) 10 mg tablet    Need for influenza vaccination - Primary    Relevant Orders    influenza vaccine, 4450-7933, high-dose, PF 0 5 mL (FLUZONE HIGH-DOSE) (Completed)      Other Visit Diagnoses     Need for shingles vaccine        Relevant Medications    Zoster Vac Recomb Adjuvanted ([facility]) 50 MCG/0 5ML SUSR    Medicare annual wellness visit, subsequent              Pt chronic conditions are stable  Routine labs completed by Dr Osbaldo Glasgow, which his next appt will be in November  Subjective:      Patient ID: Lele Soliz is a 80 y o  male  Pt here today for a check up  He shares he has a painful lesion on his R ear  He has tried neosporin for it without relief  It is very tender and appears "it needs to pop"    He has a hx of HTN: BP today is 126/80  Denies HA, SOB, vision changes  Gout: stable, no joint pains  On fuboxostat    CKD: follows with Dr Osbaldo Glasgow  Last Cr 1 86  Next appt is in November  Pt without other complaints      The following portions of the patient's history were reviewed and updated as appropriate:   He  has a past medical history of Chronic kidney disease, Dissecting aortic aneurysm (any part), abdominal ([address] ), and Gout    He   Patient Active Problem List Diagnosis Date Noted    Need for influenza vaccination 09/30/2019    Cyst on ear 09/30/2019    Needs flu shot 09/27/2018    CKD (chronic kidney disease) 07/28/2014    Hypercholesterolemia 06/18/2013    Gout 11/26/2012    Hypertension 11/26/2012    Coronary atherosclerosis 03/26/2012     He  has a past surgical history that includes Cholecystectomy and Umbilical hernia repair  His family history includes No Known Problems in his family  He  reports that he has quit smoking  He has never used smokeless tobacco  He reports that he does not drink alcohol  His drug history is not on file  Current Outpatient Medications   Medication Sig Dispense Refill    amLODIPine (NORVASC) 5 mg tablet Take 1 tablet (5 mg total) by mouth daily 90 tablet 3    aspirin (ADULT ASPIRIN EC LOW STRENGTH) 81 mg EC tablet Take by mouth      atorvastatin (LIPITOR) 10 mg tablet Take 1 tablet (10 mg total) by mouth daily 90 tablet 3    cholecalciferol (VITAMIN D3) 1,000 units tablet Take 1 tablet by mouth once a week      lisinopril-hydrochlorothiazide (PRINZIDE,ZESTORETIC) 20-25 MG per tablet Take 1 tablet by mouth daily 90 tablet 3    metoprolol succinate (TOPROL-XL) 100 mg 24 hr tablet Take 1 tablet (100 mg total) by mouth daily 90 tablet 3    potassium chloride (MICRO-K) 10 MEQ CR capsule Take 1 capsule (10 mEq total) by mouth daily 90 capsule 3    cephalexin (KEFLEX) 500 mg capsule Take 1 capsule (500 mg total) by mouth every 6 (six) hours for 7 days 28 capsule 0    febuxostat (ULORIC) 40 mg tablet Take 1 tablet (40 mg total) by mouth daily 90 tablet 3    Zoster Vac Recomb Adjuvanted (SHINGRIX) 50 MCG/0 5ML SUSR Inject 0 5 mL into a muscle once for 1 dose Repeat dose in 2 to 6 months 1 each 1     No current facility-administered medications for this visit        Current Outpatient Medications on File Prior to Visit   Medication Sig    aspirin (ADULT ASPIRIN EC LOW STRENGTH) 81 mg EC tablet Take by mouth    cholecalciferol (VITAMIN D3) 1,000 units tablet Take 1 tablet by mouth once a week    [DISCONTINUED] amLODIPine (NORVASC) 5 mg tablet Take 1 tablet (5 mg total) by mouth daily    [DISCONTINUED] atorvastatin (LIPITOR) 10 mg tablet Take 1 tablet (10 mg total) by mouth daily    [DISCONTINUED] lisinopril-hydrochlorothiazide (PRINZIDE,ZESTORETIC) 20-25 MG per tablet Take 1 tablet by mouth daily    [DISCONTINUED] metoprolol succinate (TOPROL-XL) 100 mg 24 hr tablet Take 1 tablet (100 mg total) by mouth daily    [DISCONTINUED] potassium chloride (MICRO-K) 10 MEQ CR capsule Take 1 capsule (10 mEq total) by mouth daily    [DISCONTINUED] ULORIC 40 MG tablet Take 1 tablet (40 mg total) by mouth daily (Patient not taking: Reported on 9/30/2019)     No current facility-administered medications on file prior to visit  He is allergic to augmentin es-600  [amoxicillin-pot clavulanate]; lansoprazole; and rabeprazole       Review of Systems   Constitutional: Negative for activity change, appetite change, chills, fatigue, fever and unexpected weight change  HENT: Negative for congestion, ear discharge, ear pain, postnasal drip, sinus pressure and sore throat  Eyes: Negative for discharge and visual disturbance  Respiratory: Negative for cough, shortness of breath and wheezing  Cardiovascular: Negative for chest pain, palpitations and leg swelling  Gastrointestinal: Negative for abdominal pain, constipation, diarrhea, nausea and vomiting  Endocrine: Negative for cold intolerance, heat intolerance, polydipsia and polyuria  Genitourinary: Negative for difficulty urinating and frequency  Musculoskeletal: Negative for arthralgias, back pain, joint swelling and myalgias  Skin: Negative for rash  Painful swelling of R ear   Neurological: Negative for dizziness, weakness, light-headedness, numbness and headaches  Hematological: Negative for adenopathy     Psychiatric/Behavioral: Negative for behavioral problems, confusion, dysphoric mood, sleep disturbance and suicidal ideas  The patient is not nervous/anxious  Objective:      /80   Pulse 70   Temp (!) 95 5 °F (35 3 °C)   Resp 16   Ht 5' 7" (1 702 m)   Wt 92 5 kg (204 lb)   SpO2 96%   BMI 31 95 kg/m²          Physical Exam   Constitutional: He is oriented to person, place, and time  He appears well-developed and well-nourished  No distress  HENT:   Head: Normocephalic and atraumatic  Right Ear: External ear normal    Left Ear: External ear normal    Mouth/Throat: Oropharynx is clear and moist    Cardiovascular: Normal rate, regular rhythm and normal heart sounds  Exam reveals no gallop and no friction rub  No murmur heard  Pulmonary/Chest: Effort normal and breath sounds normal  No respiratory distress  He has no wheezes  He has no rales  He exhibits no tenderness  Musculoskeletal: He exhibits no edema  Neurological: He is alert and oriented to person, place, and time  Skin: He is not diaphoretic  Psychiatric: He has a normal mood and affect  His behavior is normal  Judgment and thought content normal    Nursing note and vitals reviewed

## 2019-09-30 NOTE — PATIENT INSTRUCTIONS

## 2020-04-17 ENCOUNTER — TELEPHONE (OUTPATIENT)
Dept: FAMILY MEDICINE CLINIC | Facility: CLINIC | Age: 85
End: 2020-04-17